# Patient Record
Sex: MALE
[De-identification: names, ages, dates, MRNs, and addresses within clinical notes are randomized per-mention and may not be internally consistent; named-entity substitution may affect disease eponyms.]

---

## 2017-10-04 ENCOUNTER — APPOINTMENT (OUTPATIENT)
Dept: VASCULAR SURGERY | Facility: CLINIC | Age: 62
End: 2017-10-04

## 2019-01-25 ENCOUNTER — APPOINTMENT (OUTPATIENT)
Dept: VASCULAR SURGERY | Facility: CLINIC | Age: 64
End: 2019-01-25

## 2024-10-05 ENCOUNTER — EMERGENCY (EMERGENCY)
Facility: HOSPITAL | Age: 69
LOS: 1 days | Discharge: HOME | End: 2024-10-05
Attending: PERSONAL EMERGENCY RESPONSE ATTENDANT | Admitting: PERSONAL EMERGENCY RESPONSE ATTENDANT
Payer: MEDICARE

## 2024-10-05 VITALS
OXYGEN SATURATION: 100 % | RESPIRATION RATE: 17 BRPM | HEART RATE: 106 BPM | SYSTOLIC BLOOD PRESSURE: 132 MMHG | WEIGHT: 270.07 LBS | HEIGHT: 76 IN | TEMPERATURE: 98 F | DIASTOLIC BLOOD PRESSURE: 80 MMHG

## 2024-10-05 LAB
ALBUMIN SERPL ELPH-MCNC: 3.9 G/DL — SIGNIFICANT CHANGE UP (ref 3.3–5)
ALP SERPL-CCNC: 59 U/L — SIGNIFICANT CHANGE UP (ref 40–120)
ALT FLD-CCNC: 27 U/L — SIGNIFICANT CHANGE UP (ref 4–41)
ANION GAP SERPL CALC-SCNC: 11 MMOL/L — SIGNIFICANT CHANGE UP (ref 7–14)
APPEARANCE UR: ABNORMAL
APTT BLD: 35.5 SEC — SIGNIFICANT CHANGE UP (ref 24.5–35.6)
AST SERPL-CCNC: 43 U/L — HIGH (ref 4–40)
BASOPHILS # BLD AUTO: 0.02 K/UL — SIGNIFICANT CHANGE UP (ref 0–0.2)
BASOPHILS NFR BLD AUTO: 0.2 % — SIGNIFICANT CHANGE UP (ref 0–2)
BILIRUB SERPL-MCNC: 1.8 MG/DL — HIGH (ref 0.2–1.2)
BILIRUB UR-MCNC: NEGATIVE — SIGNIFICANT CHANGE UP
BLD GP AB SCN SERPL QL: NEGATIVE — SIGNIFICANT CHANGE UP
BUN SERPL-MCNC: 16 MG/DL — SIGNIFICANT CHANGE UP (ref 7–23)
CALCIUM SERPL-MCNC: 8.9 MG/DL — SIGNIFICANT CHANGE UP (ref 8.4–10.5)
CHLORIDE SERPL-SCNC: 104 MMOL/L — SIGNIFICANT CHANGE UP (ref 98–107)
CO2 SERPL-SCNC: 26 MMOL/L — SIGNIFICANT CHANGE UP (ref 22–31)
COLOR SPEC: YELLOW — SIGNIFICANT CHANGE UP
CREAT SERPL-MCNC: 1.03 MG/DL — SIGNIFICANT CHANGE UP (ref 0.5–1.3)
DIFF PNL FLD: ABNORMAL
EGFR: 79 ML/MIN/1.73M2 — SIGNIFICANT CHANGE UP
EOSINOPHIL # BLD AUTO: 0.1 K/UL — SIGNIFICANT CHANGE UP (ref 0–0.5)
EOSINOPHIL NFR BLD AUTO: 1.2 % — SIGNIFICANT CHANGE UP (ref 0–6)
GLUCOSE SERPL-MCNC: 162 MG/DL — HIGH (ref 70–99)
GLUCOSE UR QL: NEGATIVE MG/DL — SIGNIFICANT CHANGE UP
HCT VFR BLD CALC: 40.3 % — SIGNIFICANT CHANGE UP (ref 39–50)
HGB BLD-MCNC: 13.4 G/DL — SIGNIFICANT CHANGE UP (ref 13–17)
IANC: 5.6 K/UL — SIGNIFICANT CHANGE UP (ref 1.8–7.4)
IMM GRANULOCYTES NFR BLD AUTO: 0.5 % — SIGNIFICANT CHANGE UP (ref 0–0.9)
INR BLD: 1.2 RATIO — HIGH (ref 0.85–1.16)
KETONES UR-MCNC: ABNORMAL MG/DL
LEUKOCYTE ESTERASE UR-ACNC: ABNORMAL
LYMPHOCYTES # BLD AUTO: 1.74 K/UL — SIGNIFICANT CHANGE UP (ref 1–3.3)
LYMPHOCYTES # BLD AUTO: 20.6 % — SIGNIFICANT CHANGE UP (ref 13–44)
MCHC RBC-ENTMCNC: 30.1 PG — SIGNIFICANT CHANGE UP (ref 27–34)
MCHC RBC-ENTMCNC: 33.3 GM/DL — SIGNIFICANT CHANGE UP (ref 32–36)
MCV RBC AUTO: 90.6 FL — SIGNIFICANT CHANGE UP (ref 80–100)
MONOCYTES # BLD AUTO: 0.93 K/UL — HIGH (ref 0–0.9)
MONOCYTES NFR BLD AUTO: 11 % — SIGNIFICANT CHANGE UP (ref 2–14)
NEUTROPHILS # BLD AUTO: 5.6 K/UL — SIGNIFICANT CHANGE UP (ref 1.8–7.4)
NEUTROPHILS NFR BLD AUTO: 66.5 % — SIGNIFICANT CHANGE UP (ref 43–77)
NITRITE UR-MCNC: NEGATIVE — SIGNIFICANT CHANGE UP
NRBC # BLD: 0 /100 WBCS — SIGNIFICANT CHANGE UP (ref 0–0)
NRBC # FLD: 0 K/UL — SIGNIFICANT CHANGE UP (ref 0–0)
PH UR: 8.5 (ref 5–8)
PLATELET # BLD AUTO: 290 K/UL — SIGNIFICANT CHANGE UP (ref 150–400)
POTASSIUM SERPL-MCNC: 3.8 MMOL/L — SIGNIFICANT CHANGE UP (ref 3.5–5.3)
POTASSIUM SERPL-SCNC: 3.8 MMOL/L — SIGNIFICANT CHANGE UP (ref 3.5–5.3)
PROT SERPL-MCNC: 7.8 G/DL — SIGNIFICANT CHANGE UP (ref 6–8.3)
PROT UR-MCNC: 30 MG/DL
PROTHROM AB SERPL-ACNC: 14.2 SEC — HIGH (ref 9.9–13.4)
RBC # BLD: 4.45 M/UL — SIGNIFICANT CHANGE UP (ref 4.2–5.8)
RBC # FLD: 13.9 % — SIGNIFICANT CHANGE UP (ref 10.3–14.5)
RH IG SCN BLD-IMP: POSITIVE — SIGNIFICANT CHANGE UP
SODIUM SERPL-SCNC: 141 MMOL/L — SIGNIFICANT CHANGE UP (ref 135–145)
SP GR SPEC: 1.02 — SIGNIFICANT CHANGE UP (ref 1–1.03)
UROBILINOGEN FLD QL: 2 MG/DL (ref 0.2–1)
WBC # BLD: 8.43 K/UL — SIGNIFICANT CHANGE UP (ref 3.8–10.5)
WBC # FLD AUTO: 8.43 K/UL — SIGNIFICANT CHANGE UP (ref 3.8–10.5)

## 2024-10-05 PROCEDURE — 73630 X-RAY EXAM OF FOOT: CPT | Mod: 26,LT

## 2024-10-05 PROCEDURE — 99285 EMERGENCY DEPT VISIT HI MDM: CPT

## 2024-10-05 RX ORDER — LIDOCAINE HCL 20 MG/ML
20 AMPUL (ML) INJECTION ONCE
Refills: 0 | Status: DISCONTINUED | OUTPATIENT
Start: 2024-10-05 | End: 2024-10-09

## 2024-10-05 RX ORDER — TETANUS TOXOID, REDUCED DIPHTHERIA TOXOID AND ACELLULAR PERTUSSIS VACCINE, ADSORBED 5; 2.5; 8; 8; 2.5 [IU]/.5ML; [IU]/.5ML; UG/.5ML; UG/.5ML; UG/.5ML
0.5 SUSPENSION INTRAMUSCULAR ONCE
Refills: 0 | Status: DISCONTINUED | OUTPATIENT
Start: 2024-10-05 | End: 2024-10-05

## 2024-10-05 RX ORDER — TETANUS TOXOID, REDUCED DIPHTHERIA TOXOID AND ACELLULAR PERTUSSIS VACCINE, ADSORBED 5; 2.5; 8; 8; 2.5 [IU]/.5ML; [IU]/.5ML; UG/.5ML; UG/.5ML; UG/.5ML
0.5 SUSPENSION INTRAMUSCULAR ONCE
Refills: 0 | Status: COMPLETED | OUTPATIENT
Start: 2024-10-05 | End: 2024-10-05

## 2024-10-05 RX ADMIN — TETANUS TOXOID, REDUCED DIPHTHERIA TOXOID AND ACELLULAR PERTUSSIS VACCINE, ADSORBED 0.5 MILLILITER(S): 5; 2.5; 8; 8; 2.5 SUSPENSION INTRAMUSCULAR at 22:31

## 2024-10-05 NOTE — ED PROVIDER NOTE - PROGRESS NOTE DETAILS
Javier Diaz MD PGY-3: podiatry consulted Javier Diaz MD PGY-3: Podiatry repaired laceration of toe.  Recommend antibiotics Augmentin twice daily for 10 days.  Patient to follow-up with podiatry to get stitches removed.  Urinalysis was positive.  Patient getting Augmentin which should treat the urinalysis and UA.  Patient ambulates with a walker at baseline but he did not bring his walker here with him.  Patient will need transportation back home and does not want to be admitted to the hospital.  Attempted to call  to obtain transportation however there was no answer and a message was left. Mervat PGY3: Pt was reassessed and is doing well. Called by radiology and informed that the xray of the foot was amended and patient has a fracture of the distal phalanx of the affected toe. Recommending continuing antibiotics and hard shoe until follow up. Patient made aware. Results, including any incidental findings, were discussed. Follow up and return precautions were discussed. Patient verbalized understanding carolyn-called by admin pa regarding change to patients original xray read, now indicating fracture to hallux as per emr-patient was seen by team overnight and was dc awaiting ambulance transport, so was able to update him in real time of updated result. We called podiatry in real time as well with this update who recommended abx as already prescribed and to remain in hard shoe, toe already addressed and immobilized by original team. patient demonstrates good understanding of plan, was given strict return precautions, and will f/u with his podiatrist.

## 2024-10-05 NOTE — ED PROVIDER NOTE - NSFOLLOWUPINSTRUCTIONS_ED_ALL_ED_FT
You were evaluated in the emergency department for a toe laceration.  This was repaired by podiatrist and stitches were placed.  Please follow-up with the podiatrist in the clinic in in 1 week for follow-up.  Your podiatrist will let you know when the stitches can come out.  The stitches should come out within 3 weeks.  You can expect a phone call to help you make an appointment with the podiatrist.  If you do not hear from anyone by the end of 2 business days call yourself to make an appointment–clinic information is attached.      You were prescribed antibiotics for your urinary tract infection and for the toe.  The same antibiotic will cover both.   and take as directed for 10 days.    Laceration    A laceration is a cut that goes through all of the layers of the skin and into the tissue that is right under the skin. Some lacerations heal on their own. Others need to be closed with skin adhesive strips, skin glue, stitches (sutures), or staples. Proper laceration care minimizes the risk of infection and helps the laceration to heal better.  Non-absorbable stitches have been placed, they must be taken out within the time frame instructed by your healthcare provider - within 3 weeks by a podiatrist.    SEEK IMMEDIATE MEDICAL CARE IF YOU HAVE ANY OF THE FOLLOWING SYMPTOMS: swelling around the wound, worsening pain, drainage from the wound, red streaking going away from your wound, inability to move finger or toe near the laceration, or discoloration of skin near the laceration.      Urinary Tract Infection    A urinary tract infection (UTI) is an infection of any part of the urinary tract, which includes the kidneys, ureters, bladder, and urethra. Risk factors include ignoring your need to urinate, wiping back to front if female, being an uncircumcised male, and having diabetes or a weak immune system. Symptoms include frequent urination, pain or burning with urination, foul smelling urine, cloudy urine, pain in the lower abdomen, blood in the urine, and fever. If you were prescribed an antibiotic medicine, take it as told by your health care provider. Do not stop taking the antibiotic even if you start to feel better.    SEEK IMMEDIATE MEDICAL CARE IF YOU HAVE ANY OF THE FOLLOWING SYMPTOMS: severe back or abdominal pain, fever, inability to keep fluids or medicine down, dizziness/lightheadedness, or a change in mental status. You were evaluated in the emergency department for a toe laceration.  This was repaired by podiatrist and stitches were placed.  Please follow-up with the podiatrist in the clinic in 1 week for follow-up.      See Podiatrist Dr. Milan in 1 week. Call [phone] to make an appointment.     Followw up in 3 weeks for suture removal.      You were prescribed antibiotics for your urinary tract infection and for the toe.  The same antibiotic will cover both.   and take as directed for 10 days.    Laceration    A laceration is a cut that goes through all of the layers of the skin and into the tissue that is right under the skin. Some lacerations heal on their own. Others need to be closed with skin adhesive strips, skin glue, stitches (sutures), or staples. Proper laceration care minimizes the risk of infection and helps the laceration to heal better.  Non-absorbable stitches have been placed, they must be taken out within the time frame instructed by your healthcare provider - within 3 weeks by a podiatrist.    SEEK IMMEDIATE MEDICAL CARE IF YOU HAVE ANY OF THE FOLLOWING SYMPTOMS: swelling around the wound, worsening pain, drainage from the wound, red streaking going away from your wound, inability to move finger or toe near the laceration, or discoloration of skin near the laceration.      Urinary Tract Infection    A urinary tract infection (UTI) is an infection of any part of the urinary tract, which includes the kidneys, ureters, bladder, and urethra. Risk factors include ignoring your need to urinate, wiping back to front if female, being an uncircumcised male, and having diabetes or a weak immune system. Symptoms include frequent urination, pain or burning with urination, foul smelling urine, cloudy urine, pain in the lower abdomen, blood in the urine, and fever. If you were prescribed an antibiotic medicine, take it as told by your health care provider. Do not stop taking the antibiotic even if you start to feel better.    SEEK IMMEDIATE MEDICAL CARE IF YOU HAVE ANY OF THE FOLLOWING SYMPTOMS: severe back or abdominal pain, fever, inability to keep fluids or medicine down, dizziness/lightheadedness, or a change in mental status. You were evaluated in the emergency department for a toe laceration.  This was repaired by podiatrist and stitches were placed.  Please follow-up with the podiatrist in the clinic in 1 week for follow-up. You were also found to have a fracture in that toe and must keep it in the hard shoe and non-weight bearing.     See Podiatrist Dr. Milan in 1 week. Call [phone] to make an appointment.     Followw up in 3 weeks for suture removal.      You were prescribed antibiotics for your urinary tract infection and for the toe.  The same antibiotic will cover both.   and take as directed for 10 days.    Laceration    A laceration is a cut that goes through all of the layers of the skin and into the tissue that is right under the skin. Some lacerations heal on their own. Others need to be closed with skin adhesive strips, skin glue, stitches (sutures), or staples. Proper laceration care minimizes the risk of infection and helps the laceration to heal better.  Non-absorbable stitches have been placed, they must be taken out within the time frame instructed by your healthcare provider - within 3 weeks by a podiatrist.    SEEK IMMEDIATE MEDICAL CARE IF YOU HAVE ANY OF THE FOLLOWING SYMPTOMS: swelling around the wound, worsening pain, drainage from the wound, red streaking going away from your wound, inability to move finger or toe near the laceration, or discoloration of skin near the laceration.      Urinary Tract Infection    A urinary tract infection (UTI) is an infection of any part of the urinary tract, which includes the kidneys, ureters, bladder, and urethra. Risk factors include ignoring your need to urinate, wiping back to front if female, being an uncircumcised male, and having diabetes or a weak immune system. Symptoms include frequent urination, pain or burning with urination, foul smelling urine, cloudy urine, pain in the lower abdomen, blood in the urine, and fever. If you were prescribed an antibiotic medicine, take it as told by your health care provider. Do not stop taking the antibiotic even if you start to feel better.    SEEK IMMEDIATE MEDICAL CARE IF YOU HAVE ANY OF THE FOLLOWING SYMPTOMS: severe back or abdominal pain, fever, inability to keep fluids or medicine down, dizziness/lightheadedness, or a change in mental status.

## 2024-10-05 NOTE — ED PROVIDER NOTE - NSICDXPASTMEDICALHX_GEN_ALL_CORE_FT
Pt seen at [facility], [facility]:  PRELIMINARY SCORE= 0.0  BP= 110/64  [facility] labs as follows: Fasting  TC= 226  HDL= 55  LDL= 151  TG= 101  GLUCOSE= 73  All results and risk factors discussed with patient; all questions and concerns addressed.   Blanco Villalta PAST MEDICAL HISTORY:  DM (diabetes mellitus)     DVT, lower extremity     HLD (hyperlipidemia)     HTN (hypertension)

## 2024-10-05 NOTE — ED PROVIDER NOTE - CARE PROVIDER_API CALL
Korin Milan  Foot and Ankle Surgery  [address], [place] [address]  Phone: [phone]  Fax: [phone]  Follow Up Time:

## 2024-10-05 NOTE — ED PROVIDER NOTE - NSFOLLOWUPCLINICS_GEN_ALL_ED_FT
[facility]  Podiatry  [address] [place] [address] - [address] [place] [address]  Phone: [phone]  Fax:

## 2024-10-05 NOTE — ED ADULT NURSE NOTE - OBJECTIVE STATEMENT
Break coverage RN: Received patient in 5A c/o left great toe pain w/bleeding. Patient denies SOB, chest pain, fever. Patient is A&OX4, airway patent, breathing unlabored and even, radial pulses palpable. Labs obtained, 20G IV placed on left arm, awaiting X-ray/Podiatry. Side rails up and safety maintained. Fall precaution in place. Break coverage RN: Received patient in 5A c/o left great toe injury w/partial laceration and amputation. Patient reports lower back pain, dysuria. Patient denies SOB, chest pain, fever. PMHX DVT on Xarelto, DM, HTN, HLD. Patient is A&OX4, airway patent, breathing unlabored and even, radial pulses palpable. Labs obtained, 20G IV placed on left arm, awaiting X-ray/Podiatry. Side rails up and safety maintained. Fall precaution in place.

## 2024-10-05 NOTE — ED PROVIDER NOTE - PATIENT PORTAL LINK FT
You can access the FollowMyHealth Patient Portal offered by [facility] by registering at the following website: [email or web address]. By joining [facility]’s FollowMyHealth portal, you will also be able to view your health information using other applications (apps) compatible with our system.

## 2024-10-05 NOTE — ED PROVIDER NOTE - CLINICAL SUMMARY MEDICAL DECISION MAKING FREE TEXT BOX
Javier Diaz MD PGY-3:  69-year-old male with PMH DVT on Xarelto, diabetes on metformin, hypertension, hyperlipidemia presents with left toe partial laceration/amputation after nicking it on the carpet when walking forward.  Patient had an issue with his toe 6 years ago and had it sutured and reinjured it today.  Also complaining of right lower musculoskeletal back pain with no trauma and nonradiating for the past 3 days.  Also endorses dysuria.  Denies fever, chills, chest pain, shortness of breath, nausea, vomiting.    Vital Signs Stable  Gen: well appearing, NAD  HEENT: no conjunctivitis  Cardiac: regular rate rhythm, normal S1S2  Chest: CTA BL, no wheeze or crackles  Abdomen: normal BS, soft, NT  Extremity/MSK: no midline spinal ttp, left great toe partial amputation under nail on dorsal toe surface, limited ROM 2/2 pain, 1+ DP pulses b/l, minor right lower back ttp, +SLR right  Skin: left great toe laceration deep inderior to nail  Neuro: grossly normal    Will give tdap, cx podiatry, labs, pain control, UA. Dispo pending clinical course and results. Javier Diaz MD PGY-3:  69-year-old male with PMH DVT on Xarelto, diabetes on metformin, hypertension, hyperlipidemia presents with left toe partial laceration/amputation after nicking it on the carpet when walking forward.  Patient had an issue with his toe 6 years ago and had it sutured and reinjured it today.  Also complaining of right lower musculoskeletal back pain with no trauma and nonradiating for the past 3 days.  Also endorses dysuria.  Denies fever, chills, chest pain, shortness of breath, nausea, vomiting.    Vital Signs Stable  Gen: well appearing, NAD  HEENT: no conjunctivitis  Cardiac: regular rate rhythm, normal S1S2  Chest: CTA BL, no wheeze or crackles  Abdomen: normal BS, soft, NT  Extremity/MSK: no midline spinal ttp, left great toe partial amputation under nail on dorsal toe surface, limited ROM 2/2 pain, 1+ DP pulses b/l, minor right lower back ttp, +SLR right  Skin: left great toe laceration deep inderior to nail  Neuro: grossly normal    Will give tdap, cx podiatry, labs, pain control, UA. Dispo pending clinical course and results.    Attending MD Muñoz.  Agree with above.  Pt is a 70 yo male with pmhx of HLD, HTN, DM on metformin, DVT on xarelto who presents to Ed with complaint of L great toe injury after it caught while he was walking and he suffered a wound.  Pt endorses sig bleeding at home but EMS wrapped.  Does not remember last tetanus shot.  Pt also notes 3 days inc urinary frequency and urgency as well as dysuria.  No fevers/chills.  Mild R flank pain.  No abdominal pain/CP.  No fevers/chills noted at home.  L great toe noted to have partial amputation at ~PIP.  Bleeding c/w venous ooze on arrival to ED.  Planned tdap boost, XR's, podiatry consult for bedside vs. operative repair and UA/U culture.  Will dispo per indicated need following labs/XR and podiatry recs.

## 2024-10-05 NOTE — ED ADULT TRIAGE NOTE - CHIEF COMPLAINT QUOTE
Pt coming to ER c/o stubbed toe. Pt has stitches from an old wound that has been opening on and off for the last 4 years. Pt stubbed his toe today and the stitches opened up. Pt noted to be bleeding and redressed at this time. Hx HTT2DM, DVt on Xaralto

## 2024-10-05 NOTE — ED ADULT NURSE NOTE - NSICDXPASTMEDICALHX_GEN_ALL_CORE_FT
PAST MEDICAL HISTORY:  DM (diabetes mellitus)     DVT, lower extremity     HLD (hyperlipidemia)     HTN (hypertension)

## 2024-10-05 NOTE — ED PROVIDER NOTE - ATTENDING CONTRIBUTION TO CARE
Attending MD Muñoz:  I performed a history and physical exam of the patient and discussed their management with the resident. I reviewed the resident's note and agree with the documented findings and plan of care. My medical decision making and observations are found above.

## 2024-10-05 NOTE — ED ADULT NURSE NOTE - NSFALLHARMRISKINTERV_ED_ALL_ED

## 2024-10-06 VITALS
TEMPERATURE: 98 F | RESPIRATION RATE: 17 BRPM | OXYGEN SATURATION: 100 % | DIASTOLIC BLOOD PRESSURE: 76 MMHG | SYSTOLIC BLOOD PRESSURE: 126 MMHG | HEART RATE: 80 BPM

## 2024-10-06 LAB
BACTERIA # UR AUTO: NEGATIVE /HPF — SIGNIFICANT CHANGE UP
CAST: 0 /LPF — SIGNIFICANT CHANGE UP (ref 0–4)
RBC CASTS # UR COMP ASSIST: 55 /HPF — HIGH (ref 0–4)
REVIEW: SIGNIFICANT CHANGE UP
SQUAMOUS # UR AUTO: 5 /HPF — SIGNIFICANT CHANGE UP (ref 0–5)
WBC UR QL: 27 /HPF — HIGH (ref 0–5)

## 2024-10-06 RX ORDER — ACETAMINOPHEN 325 MG
975 TABLET ORAL ONCE
Refills: 0 | Status: COMPLETED | OUTPATIENT
Start: 2024-10-06 | End: 2024-10-06

## 2024-10-06 RX ADMIN — Medication 1 TABLET(S): at 01:33

## 2024-10-06 RX ADMIN — Medication 975 MILLIGRAM(S): at 06:51

## 2024-10-06 NOTE — ED ADULT NURSE REASSESSMENT NOTE - NS ED NURSE REASSESS COMMENT FT1
followed up with [facility] due to delay in P/U. As per [facility] "ride wasn't accepted" New  arranged with senior care. [phone]. ETA [id number]

## 2024-10-06 NOTE — PROVIDER CONTACT NOTE (OTHER) - ASSESSMENT
SW called [facility] ([phone]), spoke with Margie who reported they are extended and it will be another 60 minutes for  .ETA 10:00a.m.

## 2024-10-06 NOTE — ED ADULT NURSE REASSESSMENT NOTE - NS ED NURSE REASSESS COMMENT FT1
Pt lying in stretcher w/ eyes closed. Easy to arouse via verbal stimuli. Shows no signs of acute distress. VSS. Respirations even and unlabored. Offers no medical complaints at this time. Discharged, pending EMS transport home. IV line removed. Safety maintained.

## 2024-10-06 NOTE — ED ADULT NURSE REASSESSMENT NOTE - NS ED NURSE REASSESS COMMENT FT1
Report received from night shift RN. Pt breathing even and unlabored. No pallor or diaphoresis noted at this time. Pt denies chest pain, headache, dizziness. Pt awaiting EMS .

## 2024-10-06 NOTE — ED POST DISCHARGE NOTE - RESULT SUMMARY
Randee JORDAN :Oblique, intra-articular, nondisplaced fracture at the lateral aspect of the base of the distal phalanx of the hallux Patient still in ED red side 5A.Poke with Dr Redd who will have resident address possible Fx of toe.

## 2024-10-06 NOTE — CONSULT NOTE ADULT - ASSESSMENT
69M presents for left hallux laceration  - Pt seen and evaluated  - Afebrile, WBC 8.43  - Left foot hallux dorsal medial to lateral IPJ laceration, wound to bone, no purulence, no serous drainage, no hematoma. Right foot no open wounds, no acute signs of infection.   - Left foot xray: No acute fracture or dislocation. Soft tissue swelling of the distal left great toe.  - After obtaining verbal consent, left foot hallux laceration was copiously flushed with sterile saline solution. Laceration was primary closed with 3-0 nylon sutures from dorsal medial to dorsal lateral. Pt tolerated well.  - Dressed with bacitracin, 4x4 gauze, annelise  - Pt to keep dressing clean, dry, and intact  - Dispensed a mens XL darco post op shoe  - Pt to remain weight bearing as tolerated to left heel in darco post off shoe  - Pt to follow up within 1 week of discharge with Dr. Milan [phone]  - If pt is unable to attend office, recommend outpt podiatrist for 1 week follow up upon discharge, and 3 week follow up for suture removal  - Discussed with attending 69M presents for left hallux laceration  - Pt seen and evaluated  - Afebrile, WBC 8.43  - Left foot hallux dorsal medial to lateral IPJ laceration, wound to bone, no purulence, no serous drainage, no hematoma. Right foot no open wounds, no acute signs of infection.   - Left foot xray: No acute fracture or dislocation. Soft tissue swelling of the distal left great toe.  - After obtaining verbal consent, left foot hallux laceration was copiously flushed with sterile saline solution. Laceration was primary closed with 3-0 nylon sutures from dorsal medial to dorsal lateral. Pt tolerated well.  - Dressed with bacitracin, 4x4 gauze, annelise  - Pt to keep dressing clean, dry, and intact  - Dispensed a mens XL darco post op shoe  - Pt to remain weight bearing as tolerated to left heel in darco post off shoe  - Recommend Augmentin 875 BID x 10 days  - Pt to follow up within 1 week of discharge with Dr. Milan [phone]  - If pt is unable to attend office, recommend outpt podiatrist for 1 week follow up upon discharge, and 3 week follow up for suture removal  - Discussed with attending 69M presents for left hallux laceration  - Pt seen and evaluated  - Afebrile, WBC 8.43  - Left foot hallux dorsal medial to lateral IPJ laceration, wound to bone, no purulence, no serous drainage, no hematoma. Right foot no open wounds, no acute signs of infection.   - Left foot xray: No acute fracture or dislocation. Soft tissue swelling of the distal left great toe.  - After obtaining verbal consent, left foot hallux laceration was copiously flushed with sterile saline solution. Laceration was primary closed with 3-0 nylon sutures from dorsal medial to dorsal lateral. Pt tolerated well.  - Dressed with bacitracin, 4x4 gauze, annelise  - Pt to keep dressing clean, dry, and intact  - Dispensed a mens XL darco post op shoe  - Pt to remain weight bearing as tolerated to left heel in darco post off shoe  - Recommend Augmentin 875 BID x 10 days  - Pt to follow up within 1 week of discharge with Dr. Milan [phone]  - If pt is unable to attend office, recommend outpt podiatrist for 1 week follow up upon discharge, and 3 week follow up for suture removal  - Pt stable for discharge from podiatric standpoint  - Discussed with attending

## 2024-10-06 NOTE — PROVIDER CONTACT NOTE (OTHER) - SITUATION
SW received call from the medical team, the patient is awaiting for transport by [facility] which had an ETA of 7:30a.m.

## 2024-10-06 NOTE — ED ADULT NURSE REASSESSMENT NOTE - NS ED NURSE REASSESS COMMENT FT1
Break RN: Pt awake and verbally responsive, Denies pain at this time. Breathing remains equal and unlabored on room air. NAD. Awaiting transport home.

## 2024-10-06 NOTE — CONSULT NOTE ADULT - SUBJECTIVE AND OBJECTIVE BOX
Patient is a 69y old  Male who presents with a chief complaint of     HPI:      PAST MEDICAL & SURGICAL HISTORY:  DVT, lower extremity      DM (diabetes mellitus)      HTN (hypertension)      HLD (hyperlipidemia)          MEDICATIONS  (STANDING):  lidocaine 1% Injectable 20 milliLiter(s) Local Injection Once    MEDICATIONS  (PRN):      Allergies    No Known Allergies    Intolerances        VITALS:    Vital Signs Last 24 Hrs  T(C): 37.5 (06 Oct 2024 00:12), Max: 37.5 (06 Oct 2024 00:12)  T(F): 99.5 (06 Oct 2024 00:12), Max: 99.5 (06 Oct 2024 00:12)  HR: 85 (06 Oct 2024 00:12) (85 - 106)  BP: 131/73 (06 Oct 2024 00:12) (128/77 - 132/80)  BP(mean): --  RR: 16 (06 Oct 2024 00:12) (16 - 17)  SpO2: 95% (06 Oct 2024 00:12) (95% - 100%)    Parameters below as of 06 Oct 2024 00:12  Patient On (Oxygen Delivery Method): room air        LABS:                          13.4   8.43  )-----------( 290      ( 05 Oct 2024 19:42 )             40.3       10-05    141  |  104  |  16  ----------------------------<  162[H]  3.8   |  26  |  1.03    Ca    8.9      05 Oct 2024 19:42    TPro  7.8  /  Alb  3.9  /  TBili  1.8[H]  /  DBili  x   /  AST  43[H]  /  ALT  27  /  AlkPhos  59  10-05      CAPILLARY BLOOD GLUCOSE      POCT Blood Glucose.: 174 mg/dL (05 Oct 2024 18:03)      PT/INR - ( 05 Oct 2024 19:42 )   PT: 14.2 sec;   INR: 1.20 ratio         PTT - ( 05 Oct 2024 19:42 )  PTT:35.5 sec    LOWER EXTREMITY PHYSICAL EXAM:    Vascular: DP/PT 1/4, B/L, CFT <3 seconds B/L, Temperature gradient warm to cool, B/L.   Neuro: Epicritic sensation diminished to the level of digits, B/L.  Musculoskeletal/Ortho: unremarkable  Skin: Left foot hallux dorsal medial to lateral IPJ laceration, wound to bone, no purulence, no serous drainage, no hematoma. Right foot no open wounds, no acute signs of infection.       RADIOLOGY & ADDITIONAL STUDIES:    < from: Xray Toes, Left Foot (10.05.24 @ 20:51) >    ******PRELIMINARY REPORT******      ******PRELIMINARY REPORT******         ACC: [id number] EXAM:  XR TOE(S) MIN 2 VIEWS LT   ORDERED BY: [facility]     ACC: [id number] EXAM:  XR FOOT 2 VIEWS LT   ORDERED BY: ELEAZAR LEE     PROCEDURE DATE:  10/05/2024    ******PRELIMINARY REPORT******      ******PRELIMINARY REPORT******           INTERPRETATION:  CLINICAL INDICATION: Left great toe trauma.  TECHNIQUE: 3 views of left foot, 2 views of the left great toe.    COMPARISON: None available.    FINDINGS:    No acute fracture. No dislocation. Degenerative changes most prominent at   the first metatarsophalangeal and first interphalangeal joint. Soft   tissue swelling and skin defects of the distal left great toe.        IMPRESSION:  No acute fractureor dislocation. Soft tissue swelling of the distal left   great toe.        ******PRELIMINARY REPORT******      ******PRELIMINARY REPORT******       ALIE ZACARIAS MD; Resident Radiologist  This document is a PRELIMINARY interpretation and is pending final   attending approval. Oct  5 2024  8:58PM    < end of copied text >   Patient is a 69y old  Male who presents with a chief complaint of     HPI: 69-year-old male with PMH DVT on Xarelto, diabetes on metformin, hypertension, hyperlipidemia presents with left toe partial laceration/amputation after nicking it on the carpet when walking forward.  Patient had an issue with his toe 6 years ago and had it sutured and reinjured it today.  Also complaining of right lower musculoskeletal back pain with no trauma and nonradiating for the past 3 days.  Also endorses dysuria.  Denies fever, chills, chest pain, shortness of breath, nausea, vomiting.        PAST MEDICAL & SURGICAL HISTORY:  DVT, lower extremity      DM (diabetes mellitus)      HTN (hypertension)      HLD (hyperlipidemia)          MEDICATIONS  (STANDING):  lidocaine 1% Injectable 20 milliLiter(s) Local Injection Once    MEDICATIONS  (PRN):      Allergies    No Known Allergies    Intolerances        VITALS:    Vital Signs Last 24 Hrs  T(C): 37.5 (06 Oct 2024 00:12), Max: 37.5 (06 Oct 2024 00:12)  T(F): 99.5 (06 Oct 2024 00:12), Max: 99.5 (06 Oct 2024 00:12)  HR: 85 (06 Oct 2024 00:12) (85 - 106)  BP: 131/73 (06 Oct 2024 00:12) (128/77 - 132/80)  BP(mean): --  RR: 16 (06 Oct 2024 00:12) (16 - 17)  SpO2: 95% (06 Oct 2024 00:12) (95% - 100%)    Parameters below as of 06 Oct 2024 00:12  Patient On (Oxygen Delivery Method): room air        LABS:                          13.4   8.43  )-----------( 290      ( 05 Oct 2024 19:42 )             40.3       10-05    141  |  104  |  16  ----------------------------<  162[H]  3.8   |  26  |  1.03    Ca    8.9      05 Oct 2024 19:42    TPro  7.8  /  Alb  3.9  /  TBili  1.8[H]  /  DBili  x   /  AST  43[H]  /  ALT  27  /  AlkPhos  59  10-05      CAPILLARY BLOOD GLUCOSE      POCT Blood Glucose.: 174 mg/dL (05 Oct 2024 18:03)      PT/INR - ( 05 Oct 2024 19:42 )   PT: 14.2 sec;   INR: 1.20 ratio         PTT - ( 05 Oct 2024 19:42 )  PTT:35.5 sec    LOWER EXTREMITY PHYSICAL EXAM:    Vascular: DP/PT 1/4, B/L, CFT <3 seconds B/L, Temperature gradient warm to cool, B/L.   Neuro: Epicritic sensation diminished to the level of digits, B/L.  Musculoskeletal/Ortho: unremarkable  Skin: Left foot hallux dorsal medial to lateral IPJ laceration, wound to bone, no purulence, no serous drainage, no hematoma. Right foot no open wounds, no acute signs of infection.       RADIOLOGY & ADDITIONAL STUDIES:    < from: Xray Toes, Left Foot (10.05.24 @ 20:51) >    ******PRELIMINARY REPORT******      ******PRELIMINARY REPORT******         ACC: [id number] EXAM:  XR TOE(S) MIN 2 VIEWS LT   ORDERED BY: [facility]     ACC: [id number] EXAM:  XR FOOT 2 VIEWS LT   ORDERED BY: [employer]     PROCEDURE DATE:  10/05/2024    ******PRELIMINARY REPORT******      ******PRELIMINARY REPORT******           INTERPRETATION:  CLINICAL INDICATION: Left great toe trauma.  TECHNIQUE: 3 views of left foot, 2 views of the left great toe.    COMPARISON: None available.    FINDINGS:    No acute fracture. No dislocation. Degenerative changes most prominent at   the first metatarsophalangeal and first interphalangeal joint. Soft   tissue swelling and skin defects of the distal left great toe.        IMPRESSION:  No acute fractureor dislocation. Soft tissue swelling of the distal left   great toe.        ******PRELIMINARY REPORT******      ******PRELIMINARY REPORT******       ALIE ZACARIAS MD; Resident Radiologist  This document is a PRELIMINARY interpretation and is pending final   attending approval. Oct  5 2024  8:58PM    < end of copied text >

## 2024-10-06 NOTE — ED ADULT NURSE REASSESSMENT NOTE - NS ED NURSE REASSESS COMMENT FT1
spoke with [facility] at [phone] to arrange transport back to facility spoke with [facility] at [phone] to arrange transport back to facility via ambulance- [facility]. -910 invoice # [id number] Megan CHANEY

## 2024-10-07 LAB
CULTURE RESULTS: SIGNIFICANT CHANGE UP
SPECIMEN SOURCE: SIGNIFICANT CHANGE UP